# Patient Record
(demographics unavailable — no encounter records)

---

## 2025-02-07 NOTE — ASSESSMENT
[FreeTextEntry1] : No progression of prostate cancer on leuprolide withdrawal.  Recheck PSA 6 months.

## 2025-02-07 NOTE — HISTORY OF PRESENT ILLNESS
[FreeTextEntry1] : 73-year-old with history of prostate cancer, radical prostatectomy 2007 with a pretreatment PSA of 5.5 and Tori 6 carcinoma.  Approximately 5 years later had radiation for  local recurrence for PSA of 1.8.  He is on intermittent leuprolide for biochemical recurrence.  Last leuprolide injection was June 18, 2024 when his PSA was 2.0.  PSA 12/4/2024 is undetectable.  He feels well without urinary complaints.

## 2025-02-19 NOTE — HISTORY OF PRESENT ILLNESS
[TextBox_4] : SOB ON EXERTION LAST 2 MONTH, COUGH, WHEEZING SP CHEST CT NEW NODULES/ HX OF PROSTATE CA REPEAT CT NOTED SP SLEEP STUDY MODERATE

## 2025-02-19 NOTE — DISCUSSION/SUMMARY
[FreeTextEntry1] : SOB ON EXERTION/ COPD EXACERBTAION ORDER NEBULIZER CHEST CT NOTED MODERATE BARBARA REPEAT PFT LUNG NODULES/ PROSTATE CA REPEAT CT REVIEWED

## 2025-05-21 NOTE — REASON FOR VISIT
[Follow-Up] : a follow-up visit [Abnormal CXR/ Chest CT] : an abnormal CXR/ chest CT [Sleep Apnea] : sleep apnea [COPD] : COPD [Shortness of Breath] : shortness of breath [Pulmonary Nodules] : pulmonary nodules

## 2025-05-21 NOTE — HISTORY OF PRESENT ILLNESS
[TextBox_4] : COPD STABLE STOPPED SMOKING 6 M AGO SP CHEST CT NEW NODULES/ HX OF PROSTATE CA REPEAT CT NOTED SP SLEEP STUDY MODERATE

## 2025-05-21 NOTE — DISCUSSION/SUMMARY
[FreeTextEntry1] : SOB ON EXERTION/ COPD STABLE CHEST CT NOTED MODERATE BARBARA NO MACHINE REPEAT PFT LUNG NODULES/ PROSTATE CA REPEAT CT REVIEWED

## 2025-05-30 NOTE — ASSESSMENT
[FreeTextEntry1] : :  No chest pain, no palpitations,  no orthopnea. : Alcohol: He denies alcohol use.  He does not exercise. RODRIGUEZ multiple steps. He can do 3 flights   .  . He needs blood tests. . Neg SE 6/23. EKG reviewed. He needs a lexiscan. Reviewed not resuming smoking.. Time spent 30 minutes

## 2025-05-30 NOTE — HISTORY OF PRESENT ILLNESS
[FreeTextEntry1] : History of Present Illness Symptoms:  no chest pain, no palpitations,  no orthopnea. : He stopped  smoking 1/25    He does not exercise No chest pain. RODRIGUEZ multiple steps. He can do 3 flights   .

## 2025-05-30 NOTE — REVIEW OF SYSTEMS
[Fever] : no fever [Headache] : no headache [Feeling Fatigued] : not feeling fatigued [Blurry Vision] : no blurred vision [Total Vision Loss] : no total vision loss [Hearing Loss] : no hearing loss [Chest Discomfort] : no chest discomfort [Lower Ext Edema] : no extremity edema [Palpitations] : no palpitations [Cough] : no cough [Difficulty Breathing] : no dyspnea [SOB At Rest] : not short of breath at rest [Abdominal Pain] : no abdominal pain [Diarrhea] : diarrhea [Constipation] : no constipation [Burning Sensation] : no burning sensation during urination [Joint Pain] : no joint pain [Rash] : no rash [Skin Lesions] : no skin lesions [Dizziness] : no dizziness [Memory Lapses Or Loss] : no memory loss [Confusion] : no confusion was observed [Easy Bleeding] : no tendency for easy bleeding

## 2025-05-30 NOTE — PHYSICAL EXAM
[Well Developed] : well developed [Well Nourished] : well nourished [Healthy] : healthy appearing [Normal Venous Pressure] : normal venous pressure [Normal Appearance] : was normal in appearance [Normal S1, S2] : normal S1, S2 [Normal] : normal [Rhythm Regular] : regular [Normal S1] : normal S1 [Normal S2] : normal S2 [I] : a grade 1 [2+] : left 2+ [No Abnormalities] : the abdominal aorta was not enlarged and no bruit was heard [Clear Lung Fields] : clear lung fields [Normal Rate] : the respiratory rate was normal [Normal Rhythm/Effort] : normal respiratory rhythm and effort [Non Tender] : non-tender [No Masses/organomegaly] : no masses/organomegaly [Normal Gait] : normal gait [Normal Station and Gait] : the gait and station were normal [No Edema] : no edema [No Cyanosis] : no cyanosis [No Clubbing] : no clubbing [No Rash] : no rash [No Skin Lesions] : no skin lesions [Moves all extremities] : moves all extremities [Alert and Oriented] : alert and oriented [Normal Mental Status] : the patient's orientation, memory, attention, language and fund of knowledge were normal [Appropriate] : appropriate [JVP Elevated ___cm] : the JVP was not elevated [Apical Thrill] : no thrill palpable at the apex [S3] : no S3 [S4] : no S4 [Right Carotid Bruit] : no bruit heard over the right carotid [Left Carotid Bruit] : no bruit heard over the left carotid [Bruit] : no bruit heard [Acc Muscles Use] : no accessory muscle use